# Patient Record
Sex: MALE | Race: WHITE | URBAN - METROPOLITAN AREA
[De-identification: names, ages, dates, MRNs, and addresses within clinical notes are randomized per-mention and may not be internally consistent; named-entity substitution may affect disease eponyms.]

---

## 2020-03-04 ENCOUNTER — NEW PATIENT (OUTPATIENT)
Dept: URBAN - METROPOLITAN AREA CLINIC 85 | Facility: CLINIC | Age: 80
End: 2020-03-04
Payer: MEDICARE

## 2020-03-04 DIAGNOSIS — H52.223 REGULAR ASTIGMATISM, BILATERAL: ICD-10-CM

## 2020-03-04 DIAGNOSIS — H35.361 DRUSEN (DEGENERATIVE) OF MACULA, RIGHT EYE: ICD-10-CM

## 2020-03-04 DIAGNOSIS — H25.13 AGE-RELATED NUCLEAR CATARACT, BILATERAL: Primary | ICD-10-CM

## 2020-03-04 PROCEDURE — 92004 COMPRE OPH EXAM NEW PT 1/>: CPT | Performed by: OPHTHALMOLOGY

## 2020-03-04 RX ORDER — KETOROLAC TROMETHAMINE 5 MG/ML
0.5 % SOLUTION OPHTHALMIC
Qty: 1 | Refills: 1 | Status: INACTIVE
Start: 2020-03-04 | End: 2020-11-18

## 2020-03-04 ASSESSMENT — INTRAOCULAR PRESSURE
OD: 15
OS: 16

## 2020-03-04 ASSESSMENT — VISUAL ACUITY
OS: 20/40
OD: 20/40

## 2020-03-04 ASSESSMENT — KERATOMETRY
OS: 44.00
OD: 43.75

## 2020-05-01 ENCOUNTER — Encounter (OUTPATIENT)
Dept: URBAN - METROPOLITAN AREA CLINIC 85 | Facility: CLINIC | Age: 80
End: 2020-05-01
Payer: MEDICARE

## 2020-05-01 DIAGNOSIS — Z01.818 ENCOUNTER FOR OTHER PREPROCEDURAL EXAMINATION: Primary | ICD-10-CM

## 2020-05-01 PROCEDURE — 99213 OFFICE O/P EST LOW 20 MIN: CPT | Performed by: PHYSICIAN ASSISTANT

## 2020-05-11 ENCOUNTER — SURGERY (OUTPATIENT)
Dept: URBAN - METROPOLITAN AREA SURGERY 55 | Facility: SURGERY | Age: 80
End: 2020-05-11
Payer: MEDICARE

## 2020-05-11 PROCEDURE — 66984 XCAPSL CTRC RMVL W/O ECP: CPT | Performed by: OPHTHALMOLOGY

## 2020-05-12 ENCOUNTER — POST OP (OUTPATIENT)
Dept: URBAN - METROPOLITAN AREA CLINIC 85 | Facility: CLINIC | Age: 80
End: 2020-05-12

## 2020-05-12 DIAGNOSIS — Z96.1 PRESENCE OF INTRAOCULAR LENS: Primary | ICD-10-CM

## 2020-05-12 PROCEDURE — 99024 POSTOP FOLLOW-UP VISIT: CPT | Performed by: PHYSICIAN ASSISTANT

## 2020-05-12 ASSESSMENT — INTRAOCULAR PRESSURE: OD: 16

## 2020-05-18 ENCOUNTER — POST OP (OUTPATIENT)
Dept: URBAN - METROPOLITAN AREA CLINIC 85 | Facility: CLINIC | Age: 80
End: 2020-05-18

## 2020-05-18 PROCEDURE — 99024 POSTOP FOLLOW-UP VISIT: CPT | Performed by: OPTOMETRIST

## 2020-05-18 ASSESSMENT — INTRAOCULAR PRESSURE
OD: 17
OS: 18

## 2020-05-18 ASSESSMENT — VISUAL ACUITY
OD: 20/30
OS: 20/40

## 2020-06-01 ENCOUNTER — SURGERY (OUTPATIENT)
Dept: URBAN - METROPOLITAN AREA SURGERY 55 | Facility: SURGERY | Age: 80
End: 2020-06-01
Payer: MEDICARE

## 2020-06-01 PROCEDURE — 66984 XCAPSL CTRC RMVL W/O ECP: CPT | Performed by: OPHTHALMOLOGY

## 2020-06-02 ENCOUNTER — POST OP (OUTPATIENT)
Dept: URBAN - METROPOLITAN AREA CLINIC 85 | Facility: CLINIC | Age: 80
End: 2020-06-02

## 2020-06-02 PROCEDURE — 99024 POSTOP FOLLOW-UP VISIT: CPT | Performed by: PHYSICIAN ASSISTANT

## 2020-06-02 ASSESSMENT — INTRAOCULAR PRESSURE: OS: 17

## 2020-06-22 ENCOUNTER — POST OP (OUTPATIENT)
Dept: URBAN - METROPOLITAN AREA CLINIC 85 | Facility: CLINIC | Age: 80
End: 2020-06-22

## 2020-06-22 PROCEDURE — 99024 POSTOP FOLLOW-UP VISIT: CPT | Performed by: OPTOMETRIST

## 2020-06-22 ASSESSMENT — INTRAOCULAR PRESSURE
OS: 15
OD: 15

## 2020-06-22 ASSESSMENT — VISUAL ACUITY
OS: 20/25
OD: 20/40

## 2020-11-18 ENCOUNTER — FOLLOW UP ESTABLISHED (OUTPATIENT)
Dept: URBAN - METROPOLITAN AREA CLINIC 85 | Facility: CLINIC | Age: 80
End: 2020-11-18
Payer: MEDICARE

## 2020-11-18 DIAGNOSIS — H26.493 OTHER SECONDARY CATARACT, BILATERAL: ICD-10-CM

## 2020-11-18 DIAGNOSIS — E11.9 TYPE 2 DIABETES MELLITUS WITHOUT COMPLICATIONS: Primary | ICD-10-CM

## 2020-11-18 PROCEDURE — 92014 COMPRE OPH EXAM EST PT 1/>: CPT | Performed by: OPTOMETRIST

## 2020-11-18 ASSESSMENT — VISUAL ACUITY
OS: 20/20
OD: 20/20

## 2020-11-24 ENCOUNTER — Encounter (OUTPATIENT)
Dept: URBAN - METROPOLITAN AREA CLINIC 85 | Facility: CLINIC | Age: 80
End: 2020-11-24
Payer: MEDICARE

## 2020-11-24 PROCEDURE — 99213 OFFICE O/P EST LOW 20 MIN: CPT | Performed by: PHYSICIAN ASSISTANT

## 2020-12-03 ENCOUNTER — SURGERY (OUTPATIENT)
Dept: URBAN - METROPOLITAN AREA SURGERY 55 | Facility: SURGERY | Age: 80
End: 2020-12-03
Payer: MEDICARE

## 2020-12-03 PROCEDURE — 66821 AFTER CATARACT LASER SURGERY: CPT | Performed by: OPHTHALMOLOGY

## 2021-08-06 ENCOUNTER — OFFICE VISIT (OUTPATIENT)
Dept: URBAN - METROPOLITAN AREA CLINIC 85 | Facility: CLINIC | Age: 81
End: 2021-08-06
Payer: MEDICARE

## 2021-08-06 DIAGNOSIS — H04.202 UNSPECIFIED EPIPHORA, LEFT SIDE: ICD-10-CM

## 2021-08-06 DIAGNOSIS — H52.4 PRESBYOPIA: ICD-10-CM

## 2021-08-06 PROCEDURE — 92014 COMPRE OPH EXAM EST PT 1/>: CPT | Performed by: OPTOMETRIST

## 2021-08-06 PROCEDURE — 92134 CPTRZ OPH DX IMG PST SGM RTA: CPT | Performed by: OPTOMETRIST

## 2021-08-06 ASSESSMENT — VISUAL ACUITY
OD: 20/20
OS: 20/30

## 2021-08-06 ASSESSMENT — INTRAOCULAR PRESSURE
OD: 14
OS: 14

## 2021-08-06 NOTE — IMPRESSION/PLAN
Impression: Drusen (degenerative) of macula, right eye Plan: Discussed findings with patient. No treatment needed at this time. Monitor annually.

## 2021-08-06 NOTE — IMPRESSION/PLAN
Impression: Unspecified epiphora, left side: H04.202. Plan: Discussed option of consult with oculoplastic surgeon for tearing evaluation if desired. Patient defers and will monitor for now.

## 2022-04-04 ENCOUNTER — OFFICE VISIT (OUTPATIENT)
Dept: URBAN - METROPOLITAN AREA CLINIC 85 | Facility: CLINIC | Age: 82
End: 2022-04-04
Payer: MEDICARE

## 2022-04-04 DIAGNOSIS — E11.3293 DIABETES MELLITUS TYPE 2 WITH MILD NON-PROLIFERATIVE RETINOPATHY WITHOUT MACULAR EDEMA, BILATERAL: ICD-10-CM

## 2022-04-04 DIAGNOSIS — Z79.4 LONG TERM (CURRENT) USE OF INSULIN: ICD-10-CM

## 2022-04-04 DIAGNOSIS — H43.813 VITREOUS DEGENERATION, BILATERAL: ICD-10-CM

## 2022-04-04 DIAGNOSIS — H34.232 RETINAL ARTERY BRANCH OCCLUSION, LEFT EYE: Primary | ICD-10-CM

## 2022-04-04 PROCEDURE — 92014 COMPRE OPH EXAM EST PT 1/>: CPT | Performed by: OPTOMETRIST

## 2022-04-04 ASSESSMENT — INTRAOCULAR PRESSURE
OS: 14
OD: 15

## 2022-04-04 NOTE — IMPRESSION/PLAN
Impression: Diabetes mellitus Type 2 with mild non-proliferative retinopathy without macular edema, bilateral: W32.0387. Plan: Mild diabetic retinopathy. Recommend yearly diabetic eye exam. Discussed with patient importance of good blood sugar control and compliance with regular visits with PCP, compliance with medications, healthy diet and daily exercise.

## 2022-04-04 NOTE — IMPRESSION/PLAN
Impression: Retinal artery branch occlusion, left eye: H34.232. Plan: Discussed significantly increased risk for subsequent strokes associated with this type of vision loss. Studies indicate the highest stroke risk is within the first week following the acute ophthalmic event and remains elevated for the first 30 days. Spoke at length with pt and recommend stroke workup on an emergent basis at Emergency Room at [facility] with neurology consult. MRI  with diffusion weighted imaging is recommended. Carotid ultrasound is recommended to look for stenosis or compromise of the internal carotid artery. Electrocardiography to check for arrythmia and electrocardiogram to evaluate for cardiac ischemia as well. Sedimentation rate and C-RP are recommended to rule out giant cell arteritis. Fax exam notes and recommendations to [facility] and hard copy of exam notes and recommendations given to patient to hand deliver to ER today. Discussed serious nature of condition 
and reiterated importance of emergent eval. at ER. Consult soon with retinal specialist or ASAP if any decreased vision or involvement with the other eye.

## 2022-05-26 ENCOUNTER — OFFICE VISIT (OUTPATIENT)
Dept: URBAN - METROPOLITAN AREA CLINIC 85 | Facility: CLINIC | Age: 82
End: 2022-05-26
Payer: MEDICARE

## 2022-05-26 DIAGNOSIS — H34.232 RETINAL ARTERY BRANCH OCCLUSION, LEFT EYE: Primary | ICD-10-CM

## 2022-05-26 DIAGNOSIS — H43.813 VITREOUS DEGENERATION, BILATERAL: ICD-10-CM

## 2022-05-26 PROCEDURE — 92134 CPTRZ OPH DX IMG PST SGM RTA: CPT | Performed by: OPHTHALMOLOGY

## 2022-05-26 PROCEDURE — 92014 COMPRE OPH EXAM EST PT 1/>: CPT | Performed by: OPHTHALMOLOGY

## 2022-05-26 ASSESSMENT — INTRAOCULAR PRESSURE
OD: 16
OS: 16

## 2022-05-26 NOTE — IMPRESSION/PLAN
Impression: Retinal artery branch occlusion, left eye: H34.232. Plan: The exam and oct show that the patient has a BRAO OS. Unfortunately there is no current treatment for this. We will observe this for the time being, the patient will return to see Dr. Geronimo Soares in 6 months for a follow up.

## 2022-11-17 ENCOUNTER — OFFICE VISIT (OUTPATIENT)
Dept: URBAN - METROPOLITAN AREA CLINIC 85 | Facility: CLINIC | Age: 82
End: 2022-11-17
Payer: MEDICARE

## 2022-11-17 DIAGNOSIS — H34.232 RETINAL ARTERY BRANCH OCCLUSION, LEFT EYE: Primary | ICD-10-CM

## 2022-11-17 PROCEDURE — 92012 INTRM OPH EXAM EST PATIENT: CPT | Performed by: OPHTHALMOLOGY

## 2022-11-17 ASSESSMENT — INTRAOCULAR PRESSURE
OS: 15
OD: 14

## 2022-11-17 NOTE — IMPRESSION/PLAN
Impression: Retinal artery branch occlusion, left eye: H34.232. Plan: No current treatment for this. We will observe this for the time being.  Follow up as scheduled with cardiologist.

## 2023-05-18 ENCOUNTER — OFFICE VISIT (OUTPATIENT)
Dept: URBAN - METROPOLITAN AREA CLINIC 85 | Facility: CLINIC | Age: 83
End: 2023-05-18
Payer: MEDICARE

## 2023-05-18 DIAGNOSIS — H34.232 RETINAL ARTERY BRANCH OCCLUSION, LEFT EYE: Primary | ICD-10-CM

## 2023-05-18 DIAGNOSIS — Z79.4 LONG TERM (CURRENT) USE OF INSULIN: ICD-10-CM

## 2023-05-18 DIAGNOSIS — E11.3293 DIABETES MELLITUS TYPE 2 WITH MILD NON-PROLIFERATIVE RETINOPATHY WITHOUT MACULAR EDEMA, BILATERAL: ICD-10-CM

## 2023-05-18 PROCEDURE — 92134 CPTRZ OPH DX IMG PST SGM RTA: CPT | Performed by: OPHTHALMOLOGY

## 2023-05-18 PROCEDURE — 92014 COMPRE OPH EXAM EST PT 1/>: CPT | Performed by: OPHTHALMOLOGY

## 2023-05-18 ASSESSMENT — INTRAOCULAR PRESSURE
OS: 15
OD: 15

## 2023-05-18 ASSESSMENT — VISUAL ACUITY
OS: 20/30
OD: 20/20

## 2023-05-18 NOTE — IMPRESSION/PLAN
Impression: Diabetes mellitus Type 2 with mild non-proliferative retinopathy without macular edema, bilateral: N22.8153. Plan: Mild diabetic retinopathy. Recommend yearly diabetic eye exam. Discussed with patient importance of good blood sugar control and compliance with regular visits with PCP, compliance with medications, healthy diet and daily exercise. Return to clinic in 6 months.

## 2023-05-18 NOTE — IMPRESSION/PLAN
Impression: Retinal artery branch occlusion, left eye: H34.232. Plan: The exam and oct show that the patient has a BRAO OS. Unfortunately there is no current treatment for this. We will observe this for the time being, patient will return in 6 months for a follow up.

## 2023-11-10 ENCOUNTER — OFFICE VISIT (OUTPATIENT)
Dept: URBAN - METROPOLITAN AREA CLINIC 85 | Facility: CLINIC | Age: 83
End: 2023-11-10
Payer: MEDICARE

## 2023-11-10 DIAGNOSIS — Z79.4 LONG TERM (CURRENT) USE OF INSULIN: ICD-10-CM

## 2023-11-10 DIAGNOSIS — H26.491 OTHER SECONDARY CATARACT, RIGHT EYE: ICD-10-CM

## 2023-11-10 DIAGNOSIS — E11.3293 DIABETES MELLITUS TYPE 2 WITH MILD NON-PROLIFERATIVE RETINOPATHY WITHOUT MACULAR EDEMA, BILATERAL: Primary | ICD-10-CM

## 2023-11-10 DIAGNOSIS — H34.232 RETINAL ARTERY BRANCH OCCLUSION, LEFT EYE: ICD-10-CM

## 2023-11-10 DIAGNOSIS — H43.813 VITREOUS DEGENERATION, BILATERAL: ICD-10-CM

## 2023-11-10 PROCEDURE — 92014 COMPRE OPH EXAM EST PT 1/>: CPT | Performed by: OPTOMETRIST

## 2023-11-10 PROCEDURE — 92134 CPTRZ OPH DX IMG PST SGM RTA: CPT | Performed by: OPTOMETRIST

## 2023-11-10 ASSESSMENT — INTRAOCULAR PRESSURE
OS: 14
OD: 14

## 2023-11-10 ASSESSMENT — VISUAL ACUITY
OD: 20/25
OS: 20/20